# Patient Record
Sex: FEMALE | Race: WHITE | URBAN - METROPOLITAN AREA
[De-identification: names, ages, dates, MRNs, and addresses within clinical notes are randomized per-mention and may not be internally consistent; named-entity substitution may affect disease eponyms.]

---

## 2023-08-29 ENCOUNTER — OFFICE VISIT (OUTPATIENT)
Dept: PRIMARY CARE | Facility: CLINIC | Age: 63
End: 2023-08-29
Payer: COMMERCIAL

## 2023-08-29 VITALS
SYSTOLIC BLOOD PRESSURE: 130 MMHG | DIASTOLIC BLOOD PRESSURE: 80 MMHG | HEIGHT: 65 IN | OXYGEN SATURATION: 96 % | WEIGHT: 182 LBS | HEART RATE: 67 BPM | BODY MASS INDEX: 30.32 KG/M2

## 2023-08-29 DIAGNOSIS — F41.9 ANXIETY TENSION STATE: ICD-10-CM

## 2023-08-29 DIAGNOSIS — Z11.59 NEED FOR HEPATITIS C SCREENING TEST: ICD-10-CM

## 2023-08-29 DIAGNOSIS — Z12.31 ENCOUNTER FOR SCREENING MAMMOGRAM FOR MALIGNANT NEOPLASM OF BREAST: ICD-10-CM

## 2023-08-29 DIAGNOSIS — Z00.00 ANNUAL PHYSICAL EXAM: ICD-10-CM

## 2023-08-29 DIAGNOSIS — Z23 IMMUNIZATION DUE: Primary | ICD-10-CM

## 2023-08-29 DIAGNOSIS — E78.2 HYPERLIPIDEMIA, MIXED: ICD-10-CM

## 2023-08-29 DIAGNOSIS — E78.2 HYPERLIPIDEMIA, MIXED: Primary | ICD-10-CM

## 2023-08-29 PROBLEM — E66.9 OBESITY (BMI 30.0-34.9): Status: ACTIVE | Noted: 2023-08-29

## 2023-08-29 PROBLEM — E66.811 OBESITY (BMI 30.0-34.9): Status: ACTIVE | Noted: 2023-08-29

## 2023-08-29 LAB
ALANINE AMINOTRANSFERASE (SGPT) (U/L) IN SER/PLAS: 25 U/L (ref 7–45)
ALBUMIN (G/DL) IN SER/PLAS: 4.7 G/DL (ref 3.4–5)
ALKALINE PHOSPHATASE (U/L) IN SER/PLAS: 62 U/L (ref 33–136)
ANION GAP IN SER/PLAS: 14 MMOL/L (ref 10–20)
ASPARTATE AMINOTRANSFERASE (SGOT) (U/L) IN SER/PLAS: 42 U/L (ref 9–39)
BILIRUBIN TOTAL (MG/DL) IN SER/PLAS: 1 MG/DL (ref 0–1.2)
CALCIUM (MG/DL) IN SER/PLAS: 9.2 MG/DL (ref 8.6–10.6)
CARBON DIOXIDE, TOTAL (MMOL/L) IN SER/PLAS: 26 MMOL/L (ref 21–32)
CHLORIDE (MMOL/L) IN SER/PLAS: 104 MMOL/L (ref 98–107)
CHOLESTEROL (MG/DL) IN SER/PLAS: 195 MG/DL (ref 0–199)
CHOLESTEROL IN HDL (MG/DL) IN SER/PLAS: 64.5 MG/DL
CHOLESTEROL/HDL RATIO: 3
CREATININE (MG/DL) IN SER/PLAS: 0.73 MG/DL (ref 0.5–1.05)
ESTIMATED AVERAGE GLUCOSE FOR HBA1C: 120 MG/DL
GFR FEMALE: >90 ML/MIN/1.73M2
GLUCOSE (MG/DL) IN SER/PLAS: 87 MG/DL (ref 74–99)
HEMOGLOBIN A1C/HEMOGLOBIN TOTAL IN BLOOD: 5.8 %
HEPATITIS C VIRUS AB PRESENCE IN SERUM: NONREACTIVE
LDL: 114 MG/DL (ref 0–99)
POTASSIUM (MMOL/L) IN SER/PLAS: 5.2 MMOL/L (ref 3.5–5.3)
PROTEIN TOTAL: 7.5 G/DL (ref 6.4–8.2)
SODIUM (MMOL/L) IN SER/PLAS: 139 MMOL/L (ref 136–145)
THYROTROPIN (MIU/L) IN SER/PLAS BY DETECTION LIMIT <= 0.05 MIU/L: 6.38 MIU/L (ref 0.44–3.98)
TRIGLYCERIDE (MG/DL) IN SER/PLAS: 81 MG/DL (ref 0–149)
UREA NITROGEN (MG/DL) IN SER/PLAS: 16 MG/DL (ref 6–23)
VLDL: 16 MG/DL (ref 0–40)

## 2023-08-29 PROCEDURE — 83036 HEMOGLOBIN GLYCOSYLATED A1C: CPT

## 2023-08-29 PROCEDURE — 90715 TDAP VACCINE 7 YRS/> IM: CPT | Performed by: INTERNAL MEDICINE

## 2023-08-29 PROCEDURE — 1036F TOBACCO NON-USER: CPT | Performed by: INTERNAL MEDICINE

## 2023-08-29 PROCEDURE — 80053 COMPREHEN METABOLIC PANEL: CPT

## 2023-08-29 PROCEDURE — 84443 ASSAY THYROID STIM HORMONE: CPT

## 2023-08-29 PROCEDURE — 99396 PREV VISIT EST AGE 40-64: CPT | Performed by: INTERNAL MEDICINE

## 2023-08-29 PROCEDURE — 80061 LIPID PANEL: CPT

## 2023-08-29 PROCEDURE — 90471 IMMUNIZATION ADMIN: CPT | Performed by: INTERNAL MEDICINE

## 2023-08-29 PROCEDURE — 86803 HEPATITIS C AB TEST: CPT

## 2023-08-29 RX ORDER — ROSUVASTATIN CALCIUM 20 MG/1
20 TABLET, COATED ORAL DAILY
Qty: 90 TABLET | Refills: 3 | Status: SHIPPED | OUTPATIENT
Start: 2023-08-29

## 2023-08-29 RX ORDER — ROSUVASTATIN CALCIUM 20 MG/1
20 TABLET, COATED ORAL DAILY
COMMUNITY
Start: 2021-08-26 | End: 2023-08-29 | Stop reason: SDUPTHER

## 2023-08-29 RX ORDER — HYDROXYZINE HYDROCHLORIDE 25 MG/1
12.5-25 TABLET, FILM COATED ORAL EVERY 8 HOURS PRN
Qty: 30 TABLET | Refills: 0 | Status: SHIPPED | OUTPATIENT
Start: 2023-08-29 | End: 2023-09-28

## 2023-08-29 ASSESSMENT — PATIENT HEALTH QUESTIONNAIRE - PHQ9
1. LITTLE INTEREST OR PLEASURE IN DOING THINGS: NOT AT ALL
2. FEELING DOWN, DEPRESSED OR HOPELESS: NOT AT ALL
SUM OF ALL RESPONSES TO PHQ9 QUESTIONS 1 AND 2: 0
SUM OF ALL RESPONSES TO PHQ9 QUESTIONS 1 AND 2: 0
2. FEELING DOWN, DEPRESSED OR HOPELESS: NOT AT ALL
1. LITTLE INTEREST OR PLEASURE IN DOING THINGS: NOT AT ALL

## 2023-08-29 NOTE — PROGRESS NOTES
"mammSubjective   Patient ID: Patricio Luciano is a 62 y.o. female who presents for Annual Exam (Patient here for complete physical exam).    HPI   C/O intermittent tension, due to daily stress exposure, requesting a short supply of medication \"to relax when I need\".  Review of Systems  Anxious state   Objective   /80   Pulse 67   Ht 1.651 m (5' 5\")   Wt 82.6 kg (182 lb)   SpO2 96%   BMI 30.29 kg/m²     Physical Exam  NAD. Cooperative.  HEENT: WNL  Neck: WNL  Lungs CTA  Heart: RRR  Abdomen: WNL  Musculoskeletal system: WNL  Neurologic exam: WNL    Assessment/Plan   Diagnoses and all orders for this visit:  Immunization due  -     diphth,pertus,acell,,tetanus (BoostRIX) 2.5-8-5 Lf-mcg-Lf/0.5mL injection; Inject 0.5 mL into the shoulder, thigh, or buttocks 1 time for 1 dose.  -     Tdap vaccine, age 10 years and older  (BOOSTRIX)  Annual physical exam  -     Comprehensive metabolic panel; Future  -     CBC; Future  -     Hemoglobin A1C; Future  -     TSH; Future  Anxiety tension state  -     hydrOXYzine HCL (Atarax) 25 mg tablet; Take 0.5-1 tablets (12.5-25 mg) by mouth every 8 hours if needed for anxiety (1/2-1 tablet every 8 hours as needed).  Need for hepatitis C screening test  -     Hepatitis C Antibody; Future  Hyperlipidemia, mixed  -     rosuvastatin (Crestor) 20 mg tablet; Take 1 tablet (20 mg) by mouth once daily.  -     CT cardiac scoring wo IV contrast; Future  -     Lipid panel; Future  Encounter for screening mammogram for malignant neoplasm of breast  -     BI mammo bilateral screening tomosynthesis; Future  Updated colonoscopy in 2018, 10 year screening, cologuard in 2022.  GYN exam up to date, last exam in 2023.       "

## 2023-08-30 ENCOUNTER — TELEPHONE (OUTPATIENT)
Dept: PRIMARY CARE | Facility: CLINIC | Age: 63
End: 2023-08-30
Payer: COMMERCIAL

## 2023-08-30 DIAGNOSIS — Z00.00 ANNUAL PHYSICAL EXAM: Primary | ICD-10-CM

## 2023-08-30 RX ORDER — ROSUVASTATIN CALCIUM 20 MG/1
20 TABLET, COATED ORAL NIGHTLY
Qty: 90 TABLET | Refills: 0 | Status: SHIPPED | OUTPATIENT
Start: 2023-08-30

## 2023-09-06 LAB
ERYTHROCYTE DISTRIBUTION WIDTH (RATIO) BY AUTOMATED COUNT: 13.7 % (ref 11.5–14.5)
ERYTHROCYTE MEAN CORPUSCULAR HEMOGLOBIN CONCENTRATION (G/DL) BY AUTOMATED: 31.3 G/DL (ref 32–36)
ERYTHROCYTE MEAN CORPUSCULAR VOLUME (FL) BY AUTOMATED COUNT: 90 FL (ref 80–100)
ERYTHROCYTES (10*6/UL) IN BLOOD BY AUTOMATED COUNT: 5.26 X10E12/L (ref 4–5.2)
HEMATOCRIT (%) IN BLOOD BY AUTOMATED COUNT: 47.6 % (ref 36–46)
HEMOGLOBIN (G/DL) IN BLOOD: 14.9 G/DL (ref 12–16)
LEUKOCYTES (10*3/UL) IN BLOOD BY AUTOMATED COUNT: 6.6 X10E9/L (ref 4.4–11.3)
NRBC (PER 100 WBCS) BY AUTOMATED COUNT: 0 /100 WBC (ref 0–0)
PLATELETS (10*3/UL) IN BLOOD AUTOMATED COUNT: 178 X10E9/L (ref 150–450)

## 2023-09-06 PROCEDURE — 85027 COMPLETE CBC AUTOMATED: CPT

## 2024-09-20 DIAGNOSIS — E78.2 HYPERLIPIDEMIA, MIXED: ICD-10-CM

## 2024-09-20 RX ORDER — ROSUVASTATIN CALCIUM 20 MG/1
20 TABLET, COATED ORAL DAILY
Qty: 90 TABLET | Refills: 0 | Status: SHIPPED | OUTPATIENT
Start: 2024-09-20

## 2024-09-23 ENCOUNTER — APPOINTMENT (OUTPATIENT)
Dept: PRIMARY CARE | Facility: CLINIC | Age: 64
End: 2024-09-23
Payer: COMMERCIAL

## 2024-09-23 ENCOUNTER — LAB (OUTPATIENT)
Dept: LAB | Facility: LAB | Age: 64
End: 2024-09-23
Payer: COMMERCIAL

## 2024-09-23 VITALS
BODY MASS INDEX: 30.16 KG/M2 | OXYGEN SATURATION: 97 % | WEIGHT: 181 LBS | DIASTOLIC BLOOD PRESSURE: 72 MMHG | HEIGHT: 65 IN | SYSTOLIC BLOOD PRESSURE: 119 MMHG | HEART RATE: 76 BPM

## 2024-09-23 DIAGNOSIS — Z00.00 ANNUAL PHYSICAL EXAM: Primary | ICD-10-CM

## 2024-09-23 DIAGNOSIS — Z12.31 VISIT FOR SCREENING MAMMOGRAM: ICD-10-CM

## 2024-09-23 DIAGNOSIS — Z00.00 ANNUAL PHYSICAL EXAM: ICD-10-CM

## 2024-09-23 DIAGNOSIS — Z23 IMMUNIZATION DUE: ICD-10-CM

## 2024-09-23 DIAGNOSIS — E78.2 HYPERLIPIDEMIA, MIXED: ICD-10-CM

## 2024-09-23 LAB
ALBUMIN SERPL BCP-MCNC: 4.4 G/DL (ref 3.4–5)
ALP SERPL-CCNC: 64 U/L (ref 33–136)
ALT SERPL W P-5'-P-CCNC: 18 U/L (ref 7–45)
ANION GAP SERPL CALC-SCNC: 10 MMOL/L (ref 10–20)
AST SERPL W P-5'-P-CCNC: 21 U/L (ref 9–39)
BILIRUB SERPL-MCNC: 0.9 MG/DL (ref 0–1.2)
BUN SERPL-MCNC: 14 MG/DL (ref 6–23)
CALCIUM SERPL-MCNC: 9.2 MG/DL (ref 8.6–10.6)
CHLORIDE SERPL-SCNC: 105 MMOL/L (ref 98–107)
CHOLEST SERPL-MCNC: 180 MG/DL (ref 0–199)
CHOLESTEROL/HDL RATIO: 2.8
CO2 SERPL-SCNC: 30 MMOL/L (ref 21–32)
CREAT SERPL-MCNC: 0.84 MG/DL (ref 0.5–1.05)
EGFRCR SERPLBLD CKD-EPI 2021: 79 ML/MIN/1.73M*2
ERYTHROCYTE [DISTWIDTH] IN BLOOD BY AUTOMATED COUNT: 13.7 % (ref 11.5–14.5)
EST. AVERAGE GLUCOSE BLD GHB EST-MCNC: 120 MG/DL
GLUCOSE SERPL-MCNC: 95 MG/DL (ref 74–99)
HBA1C MFR BLD: 5.8 %
HCT VFR BLD AUTO: 46.6 % (ref 36–46)
HDLC SERPL-MCNC: 64.7 MG/DL
HGB BLD-MCNC: 14.9 G/DL (ref 12–16)
LDLC SERPL CALC-MCNC: 101 MG/DL
MCH RBC QN AUTO: 28.2 PG (ref 26–34)
MCHC RBC AUTO-ENTMCNC: 32 G/DL (ref 32–36)
MCV RBC AUTO: 88 FL (ref 80–100)
NON HDL CHOLESTEROL: 115 MG/DL (ref 0–149)
NRBC BLD-RTO: 0 /100 WBCS (ref 0–0)
PLATELET # BLD AUTO: 157 X10*3/UL (ref 150–450)
POTASSIUM SERPL-SCNC: 4.4 MMOL/L (ref 3.5–5.3)
PROT SERPL-MCNC: 7 G/DL (ref 6.4–8.2)
RBC # BLD AUTO: 5.28 X10*6/UL (ref 4–5.2)
SODIUM SERPL-SCNC: 141 MMOL/L (ref 136–145)
TRIGL SERPL-MCNC: 70 MG/DL (ref 0–149)
TSH SERPL-ACNC: 5.25 MIU/L (ref 0.44–3.98)
VLDL: 14 MG/DL (ref 0–40)
WBC # BLD AUTO: 4.8 X10*3/UL (ref 4.4–11.3)

## 2024-09-23 PROCEDURE — 90471 IMMUNIZATION ADMIN: CPT | Performed by: INTERNAL MEDICINE

## 2024-09-23 PROCEDURE — 90472 IMMUNIZATION ADMIN EACH ADD: CPT | Performed by: INTERNAL MEDICINE

## 2024-09-23 PROCEDURE — 90656 IIV3 VACC NO PRSV 0.5 ML IM: CPT | Performed by: INTERNAL MEDICINE

## 2024-09-23 PROCEDURE — 80061 LIPID PANEL: CPT

## 2024-09-23 PROCEDURE — 1036F TOBACCO NON-USER: CPT | Performed by: INTERNAL MEDICINE

## 2024-09-23 PROCEDURE — 36415 COLL VENOUS BLD VENIPUNCTURE: CPT

## 2024-09-23 PROCEDURE — 83036 HEMOGLOBIN GLYCOSYLATED A1C: CPT

## 2024-09-23 PROCEDURE — 84443 ASSAY THYROID STIM HORMONE: CPT

## 2024-09-23 PROCEDURE — 90679 RSV VACC PREF RECOMB ADJT IM: CPT | Performed by: INTERNAL MEDICINE

## 2024-09-23 PROCEDURE — 80053 COMPREHEN METABOLIC PANEL: CPT

## 2024-09-23 PROCEDURE — 3008F BODY MASS INDEX DOCD: CPT | Performed by: INTERNAL MEDICINE

## 2024-09-23 PROCEDURE — 85027 COMPLETE CBC AUTOMATED: CPT

## 2024-09-23 PROCEDURE — 99396 PREV VISIT EST AGE 40-64: CPT | Performed by: INTERNAL MEDICINE

## 2024-09-23 RX ORDER — ROSUVASTATIN CALCIUM 20 MG/1
20 TABLET, COATED ORAL NIGHTLY
Qty: 90 TABLET | Refills: 3 | Status: SHIPPED | OUTPATIENT
Start: 2024-09-23

## 2024-09-23 ASSESSMENT — PATIENT HEALTH QUESTIONNAIRE - PHQ9
SUM OF ALL RESPONSES TO PHQ9 QUESTIONS 1 AND 2: 0
1. LITTLE INTEREST OR PLEASURE IN DOING THINGS: NOT AT ALL
2. FEELING DOWN, DEPRESSED OR HOPELESS: NOT AT ALL

## 2024-09-23 NOTE — PROGRESS NOTES
"Subjective   Patient ID: MARY Luciano is a 63 y.o. female who presents for Annual Exam (Patient here for complete physical exam).    HPI   The patient presents for an annual wellness exam with no concerns.    Review of Systems  Negative  Objective   /72   Pulse 76   Ht 1.651 m (5' 5\")   Wt 82.1 kg (181 lb)   SpO2 97%   BMI 30.12 kg/m²     Physical Exam  NAD. Cooperative.  HEENT: WNL  Neck: WNL  Lungs CTA  Heart: RRR  Abdomen: WNL  Musculoskeletal system: WNL  Neurologic exam: WNL    Assessment/Plan   Diagnoses and all orders for this visit:  Annual physical exam  -     Comprehensive metabolic panel; Future  -     CBC; Future  -     TSH; Future  -     Lipid panel; Future  -     Hemoglobin A1C; Future  Visit for screening mammogram  -     BI mammo bilateral screening tomosynthesis; Future  Hyperlipidemia, mixed  -     rosuvastatin (Crestor) 20 mg tablet; Take 1 tablet (20 mg) by mouth once daily at bedtime.  -     CT cardiac scoring wo IV contrast; Future  Immunization due  -     Flu vaccine, trivalent, preservative free, age 6 months and greater (Fluarix/Fluzone/Flulaval)  -     RSV, Recombinant, 60 years and older (AREXVY)         "

## 2024-10-07 ENCOUNTER — HOSPITAL ENCOUNTER (OUTPATIENT)
Dept: RADIOLOGY | Facility: CLINIC | Age: 64
Discharge: HOME | End: 2024-10-07
Payer: COMMERCIAL

## 2024-10-07 VITALS — BODY MASS INDEX: 29.99 KG/M2 | WEIGHT: 180 LBS | HEIGHT: 65 IN

## 2024-10-07 DIAGNOSIS — Z12.31 VISIT FOR SCREENING MAMMOGRAM: ICD-10-CM

## 2024-10-07 PROCEDURE — 77067 SCR MAMMO BI INCL CAD: CPT | Performed by: RADIOLOGY

## 2024-10-07 PROCEDURE — 77063 BREAST TOMOSYNTHESIS BI: CPT | Performed by: RADIOLOGY

## 2024-10-07 PROCEDURE — 77067 SCR MAMMO BI INCL CAD: CPT

## 2025-01-16 ENCOUNTER — HOSPITAL ENCOUNTER (OUTPATIENT)
Dept: RADIOLOGY | Facility: CLINIC | Age: 65
Discharge: HOME | End: 2025-01-16
Payer: COMMERCIAL

## 2025-01-16 ENCOUNTER — APPOINTMENT (OUTPATIENT)
Dept: PRIMARY CARE | Facility: CLINIC | Age: 65
End: 2025-01-16
Payer: COMMERCIAL

## 2025-01-16 VITALS
BODY MASS INDEX: 31.78 KG/M2 | HEART RATE: 80 BPM | SYSTOLIC BLOOD PRESSURE: 130 MMHG | OXYGEN SATURATION: 98 % | DIASTOLIC BLOOD PRESSURE: 80 MMHG | WEIGHT: 191 LBS

## 2025-01-16 DIAGNOSIS — G89.29 CHRONIC PAIN OF RIGHT KNEE: ICD-10-CM

## 2025-01-16 DIAGNOSIS — M25.561 CHRONIC PAIN OF RIGHT KNEE: ICD-10-CM

## 2025-01-16 DIAGNOSIS — M25.561 CHRONIC PAIN OF RIGHT KNEE: Primary | ICD-10-CM

## 2025-01-16 DIAGNOSIS — G89.29 CHRONIC PAIN OF RIGHT KNEE: Primary | ICD-10-CM

## 2025-01-16 PROCEDURE — 99214 OFFICE O/P EST MOD 30 MIN: CPT | Performed by: INTERNAL MEDICINE

## 2025-01-16 PROCEDURE — 73560 X-RAY EXAM OF KNEE 1 OR 2: CPT | Mod: RT

## 2025-01-16 PROCEDURE — 1036F TOBACCO NON-USER: CPT | Performed by: INTERNAL MEDICINE

## 2025-01-16 RX ORDER — PREDNISONE 10 MG/1
TABLET ORAL
Qty: 30 TABLET | Refills: 0 | Status: SHIPPED | OUTPATIENT
Start: 2025-01-16

## 2025-02-17 PROBLEM — M25.561 RIGHT KNEE PAIN: Status: ACTIVE | Noted: 2025-02-17

## 2025-02-18 ENCOUNTER — EVALUATION (OUTPATIENT)
Dept: PHYSICAL THERAPY | Facility: CLINIC | Age: 65
End: 2025-02-18
Payer: COMMERCIAL

## 2025-02-18 DIAGNOSIS — G89.29 CHRONIC PAIN OF RIGHT KNEE: ICD-10-CM

## 2025-02-18 DIAGNOSIS — M25.561 RIGHT KNEE PAIN: Primary | ICD-10-CM

## 2025-02-18 DIAGNOSIS — M25.561 CHRONIC PAIN OF RIGHT KNEE: ICD-10-CM

## 2025-02-18 PROCEDURE — 97161 PT EVAL LOW COMPLEX 20 MIN: CPT | Mod: GP | Performed by: PHYSICAL THERAPIST

## 2025-02-18 PROCEDURE — 97110 THERAPEUTIC EXERCISES: CPT | Mod: GP | Performed by: PHYSICAL THERAPIST

## 2025-02-18 NOTE — PROGRESS NOTES
Physical Therapy    Physical Therapy Evaluation and Treatment      Patient Name: Mary Luciano  MRN: [id number]  Today's Date:2/18/25  Visit 1  Time Entry:   Time Calculation  Start Time: 1400  Stop Time: 1445  Time Calculation (min): 45 min  PT Evaluation Time Entry  PT Evaluation (Low) Time Entry: 25  PT Therapeutic Procedures Time Entry  Therapeutic Exercise Time Entry: 20                   Assessment:      Patient presents with clinical signs and symptoms R  knee OA with + knee joint effusion .  These impairments affect ADLs, work, recreational activity, exercise, transfer ability, and ambulation function  that requires skilled PT intervention to resolve and enable patient to return to previous level of function. Factors that may affect progress in PT are chronic pain, obesity, medical co-morbidities, language barrier and patient compliance.  Patient response to initial treatment of   education and exercise was understood by MARY Luciano     Plan:  OP PT Plan  Treatment/Interventions: Education/ Instruction, Cryotherapy, Hot pack, Manual therapy, Neuromuscular re-education, Self care/ home management, Taping techniques, Therapeutic activities, Therapeutic exercises  PT Plan: Skilled PT  PT Frequency: 1 time per week  Duration: 8  Onset Date: 01/01/24  Certification Period Start Date: 02/18/25  Certification Period End Date: 05/19/25  Rehab Potential: Fair  Plan of Care Agreement: Patient        Problem List Items Addressed This Visit             ICD-10-CM    Right knee pain - Primary M25.561    Relevant Orders    Follow Up In Physical Therapy    Follow Up In Physical Therapy      Subjective    MARY Luciano reports that she has insidious onset R medial knee pain . She was on a steroid dose pack which helps.   Worse on steps. She is from [facility] and there is a bit of a language challenge. She has h/o L knee injections that did help  Pain:   6-7/10  Home Living:   Lives with son in home with steps to the  "basement  Prior Level of Function:   Works at [employer] 32 hrs/wk    Objective   Cognition:   A+Ox3  Observation:    Hip and ankle joint clearance:  clear  Posture:  good alignment    Knee ROM: Flexion  AROM  R  125 P L 130 deg   Extension  AROM  R 0 L 0 deg      Knee Strength:  Flex  R 4/5   L 5/5,  Ext R  3+/5  L 5/5     Hip Strength:  Abduction  R  3+/5  L  4/5                  Flexion R  4/5   L 4 /5    Ligament testing:  Lachman's  R  L   medial collateral    R  L  lateral collateral   R  L    Compa's   R  L     all neg    Accessory joint mobility: patella   WNL  Palpation: tenderness to medial joint line  and R tibial anterior flare     Gait: antalgic   R LE stiff at start up    Swelling   circumference:  joint effusion  yes  R   L     6\" above superior patella        9\" below inferior patella        Special Tests:   SLR quad lag   no  , Knee dominant squat 1/4 pain R knee       Treatments:  There ex:  - Supine Bridge  - 1 x daily - 7 x weekly - 2 sets - 10 reps  - Supine Active Straight Leg Raise  - 1 x daily - 7 x weekly - 2 sets - 10 reps - 5 hold  EDUCATION:     extensive education regarding mechanism of injury, relevant functional anatomy, treatment program rational, self management, HEP, and POC    Access Code: [email or web address]  URL: [email or web address]  Date: 02/18/2025  Prepared by: Manjinder Rain    Exercises  - Supine Bridge  - 1 x daily - 7 x weekly - 2 sets - 10 reps  - Supine Active Straight Leg Raise  - 1 x daily - 7 x weekly - 2 sets - 10 reps - 5 hold  Goals:  1. Decrease pain to 0-2/10 severity level   2.  Hip hinge squat mechanics to std chair height x 5  3. Increase R LE  strength to  4 /5 MMT grade  4. Normalize gait pattern pain free at startup  5. Improve out come score by 15 points  6. independent Home exercise program     Insurance Authorization Information  Date of Evaluation: 2/18/25    Onset Date: 1/1/2024    Referring Physician: Julieta Forte     Surgery in " the Last 3 months:  no    CPT Codes: Therapeutic Exercise: 34899 Therapeutic Activity: 49077 Neuromuscular Re-Education: 73175 Manual Therapy: 61516 Gait Trainin Self-Care/Home Management Trainin Mechanical Traction: 28095 Electric Stimulation (Attended): 26147 Electric Stimulation (Unattended): 38383 Vasopneumatic Device: 17780 PT Re-Evaluation: 69382     Diagnosis:   Problem List Items Addressed This Visit             ICD-10-CM    Right knee pain - Primary M25.561    Relevant Orders    Follow Up In Physical Therapy    Follow Up In Physical Therapy          Functional Outcome:  Other Measures  Lower Extremity Funtional Score (LEFS): 29    OT / PT Evaluation complexity:  low    Which of the following best describes the primary reason of therapy: Improving, restoring, or adapting functional mobility or skills    Visits Requested: 10    Date Range: 90 days    Select all conditions that apply: Arthritis Conditions         Manjinder Rain, PT

## 2025-02-19 NOTE — PROGRESS NOTES
Physical Therapy    Physical Therapy Evaluation and Treatment      Patient Name: Mary Luciano  MRN: [id number]  Today's Date:2/18/25  Visit 1  Time Entry:   Time Calculation  Start Time: 1400  Stop Time: 1445  Time Calculation (min): 45 min  PT Evaluation Time Entry  PT Evaluation (Low) Time Entry: 25  PT Therapeutic Procedures Time Entry  Therapeutic Exercise Time Entry: 20                   Assessment:      Patient presents with clinical signs and symptoms R  knee OA with + knee joint effusion .  These impairments affect ADLs, work, recreational activity, exercise, transfer ability, and ambulation function  that requires skilled PT intervention to resolve and enable patient to return to previous level of function. Factors that may affect progress in PT are chronic pain, obesity, medical co-morbidities, language barrier and patient compliance.  Patient response to initial treatment of   education and exercise was understood by MARY Luciano     Plan:  OP PT Plan  Treatment/Interventions: Education/ Instruction, Cryotherapy, Hot pack, Manual therapy, Neuromuscular re-education, Self care/ home management, Taping techniques, Therapeutic activities, Therapeutic exercises  PT Plan: Skilled PT  PT Frequency: 1 time per week  Duration: 8  Onset Date: 01/01/24  Certification Period Start Date: 02/18/25  Certification Period End Date: 05/19/25  Rehab Potential: Fair  Plan of Care Agreement: Patient        Problem List Items Addressed This Visit             ICD-10-CM    Right knee pain - Primary M25.561    Relevant Orders    Follow Up In Physical Therapy    Follow Up In Physical Therapy      Subjective    MARY Luciano reports that she has insidious onset R medial knee pain . She was on a steroid dose pack which helps.   Worse on steps. She is from [facility] and there is a bit of a language challenge. She has h/o L knee injections that did help  Pain:   6-7/10  Home Living:   Lives with son in home with steps to the  "basement  Prior Level of Function:   Works at [employer] 32 hrs/wk    Objective   Cognition:   A+Ox3  Observation:    Hip and ankle joint clearance:  clear  Posture:  good alignment    Knee ROM: Flexion  AROM  R  125 P L 130 deg   Extension  AROM  R 0 L 0 deg      Knee Strength:  Flex  R 4/5   L 5/5,  Ext R  3+/5  L 5/5     Hip Strength:  Abduction  R  3+/5  L  4/5                  Flexion R  4/5   L 4 /5    Ligament testing:  Lachman's  R  L   medial collateral    R  L  lateral collateral   R  L    Compa's   R  L     all neg    Accessory joint mobility: patella   WNL  Palpation: tenderness to medial joint line  and R tibial anterior flare     Gait: antalgic   R LE stiff at start up    Swelling   circumference:  joint effusion  yes  R   L     6\" above superior patella        9\" below inferior patella        Special Tests:   SLR quad lag   no  , Knee dominant squat 1/4 pain R knee       Treatments:  There ex:  - Supine Bridge  - 1 x daily - 7 x weekly - 2 sets - 10 reps  - Supine Active Straight Leg Raise  - 1 x daily - 7 x weekly - 2 sets - 10 reps - 5 hold  EDUCATION:     extensive education regarding mechanism of injury, relevant functional anatomy, treatment program rational, self management, HEP, and POC    Access Code: [email or web address]  URL: [email or web address]  Date: 02/18/2025  Prepared by: Manjinder Rain    Exercises  - Supine Bridge  - 1 x daily - 7 x weekly - 2 sets - 10 reps  - Supine Active Straight Leg Raise  - 1 x daily - 7 x weekly - 2 sets - 10 reps - 5 hold  Goals:  1. Decrease pain to 0-2/10 severity level   2.  Hip hinge squat mechanics to std chair height x 5  3. Increase R LE  strength to  4 /5 MMT grade  4. Normalize gait pattern pain free at startup  5. Improve out come score by 15 points  6. independent Home exercise program     Insurance Authorization Information  Date of Evaluation: 2/18/25    Onset Date: 1/1/2024    Referring Physician: Julieta Forte     Surgery in " the Last 3 months:  no    CPT Codes: Therapeutic Exercise: 34918 Therapeutic Activity: 09348 Neuromuscular Re-Education: 41210 Manual Therapy: 51204 Gait Trainin Self-Care/Home Management Trainin Mechanical Traction: 97931 Electric Stimulation (Attended): 05561 Electric Stimulation (Unattended): 10902 Vasopneumatic Device: 95968 PT Re-Evaluation: 02827     Diagnosis:   Problem List Items Addressed This Visit             ICD-10-CM    Right knee pain - Primary M25.561    Relevant Orders    Follow Up In Physical Therapy    Follow Up In Physical Therapy          Functional Outcome:  Other Measures  Lower Extremity Funtional Score (LEFS): 29    OT / PT Evaluation complexity:  low    Which of the following best describes the primary reason of therapy: Improving, restoring, or adapting functional mobility or skills    Visits Requested: 10    Date Range: 90 days    Select all conditions that apply: Arthritis Conditions         Manjinder Rain, PT

## 2025-03-06 ENCOUNTER — TREATMENT (OUTPATIENT)
Dept: PHYSICAL THERAPY | Facility: CLINIC | Age: 65
End: 2025-03-06
Payer: COMMERCIAL

## 2025-03-06 DIAGNOSIS — M25.561 CHRONIC PAIN OF RIGHT KNEE: ICD-10-CM

## 2025-03-06 DIAGNOSIS — G89.29 CHRONIC PAIN OF RIGHT KNEE: ICD-10-CM

## 2025-03-06 DIAGNOSIS — M25.561 RIGHT KNEE PAIN: ICD-10-CM

## 2025-03-06 PROCEDURE — 97110 THERAPEUTIC EXERCISES: CPT | Mod: GP,CQ

## 2025-03-06 NOTE — PROGRESS NOTES
"Physical Therapy    Physical Therapy Evaluation and Treatment      Patient Name: Patricio Luciano  MRN: [id number]  Today's Date:25  Visit 1  Time Entry:   Time Calculation  Start Time: 325  Stop Time: 410  Time Calculation (min): 45 min     PT Therapeutic Procedures Time Entry  Therapeutic Exercise Time Entry: 30                   Assessment:  Pt was able to progress PRE's and had some fatigue after Nu Step    Plan:   Monitor post nu step sx's        Problem List Items Addressed This Visit             ICD-10-CM    Right knee pain M25.561      Subjective    \"Rising from sitting is the most painful, /10 today.\"  Pain:   5/10      Objective   Decreased WB R initially        Treatments:  There ex:  R QS x 20, 5\"  ASLR x 20, 5\"  Ball squeeze x 20  Blue band hooklying clams x 20  LSLR x 20  Bridges x 20  LAQ x 20  4\" Step ups x 20  Mini squats x 10  Nu Step x 5 min  Supine ice wrap EOS      EDUCATION:     extensive education regarding mechanism of injury, relevant functional anatomy, treatment program rational, self management, HEP, and POC    Access Code: [email or web address]  URL: [email or web address]  Date: 2025  Prepared by: Manjinder Rain    Exercises  - Supine Bridge  - 1 x daily - 7 x weekly - 2 sets - 10 reps  - Supine Active Straight Leg Raise  - 1 x daily - 7 x weekly - 2 sets - 10 reps - 5 hold  Goals:  1. Decrease pain to 0-2/10 severity level   2.  Hip hinge squat mechanics to std chair height x 5  3. Increase R LE  strength to  4 /5 MMT grade  4. Normalize gait pattern pain free at startup  5. Improve out come score by 15 points  6. independent Home exercise program     Insurance Authorization Information  Date of Evaluation: 25    Onset Date: 2024    Referring Physician: Julieta Forte     Surgery in the Last 3 months:  no    CPT Codes: Therapeutic Exercise: 44698 Therapeutic Activity: 77626 Neuromuscular Re-Education: 16165 Manual Therapy: 72372 Gait Trainin " Self-Care/Home Management Trainin Mechanical Traction: 52978 Electric Stimulation (Attended): 80221 Electric Stimulation (Unattended): 26645 Vasopneumatic Device: 64766 PT Re-Evaluation: 90588     Diagnosis:   Problem List Items Addressed This Visit             ICD-10-CM    Right knee pain M25.561          Functional Outcome:       OT / PT Evaluation complexity:  low    Which of the following best describes the primary reason of therapy: Improving, restoring, or adapting functional mobility or skills    Visits Requested: 10    Date Range: 90 days    Select all conditions that apply: Arthritis Conditions         Blanca Lopez, DEAN

## 2025-03-13 ENCOUNTER — TREATMENT (OUTPATIENT)
Dept: PHYSICAL THERAPY | Facility: CLINIC | Age: 65
End: 2025-03-13
Payer: COMMERCIAL

## 2025-03-13 DIAGNOSIS — M25.561 RIGHT KNEE PAIN: ICD-10-CM

## 2025-03-13 DIAGNOSIS — M25.561 CHRONIC PAIN OF RIGHT KNEE: ICD-10-CM

## 2025-03-13 DIAGNOSIS — G89.29 CHRONIC PAIN OF RIGHT KNEE: ICD-10-CM

## 2025-03-13 PROCEDURE — 97110 THERAPEUTIC EXERCISES: CPT | Mod: GP,CQ

## 2025-03-13 NOTE — PROGRESS NOTES
"Physical Therapy    Physical Therapy  Treatment      Patient Name: Patricio Luciano  MRN: [id number]  Today's Date:2/18/25  Visit 3  Time Entry:   Time Calculation  Start Time: 0245  Stop Time: 0330  Time Calculation (min): 45 min     PT Therapeutic Procedures Time Entry  Therapeutic Exercise Time Entry: 30                   Assessment:  Pt was able to progress strengthening to rx, fatigued but no increased pain reported    Plan:   S/L clams, LSLR on wall, pretzels        Problem List Items Addressed This Visit             ICD-10-CM    Right knee pain M25.561      Subjective    \"R knee is about the same.\"  Pain:   5/10 R knee w/ activity, none entering clinic      Objective   Lateral sway w/ amb entering clinic        Treatments:  There ex:  R QS x 20, 5\"  SLR in ER x 20, 5\"  Ball squeeze x 20  Blue band hooklying clams x 20  LSLR x 20  Bridges x 20  LAQ #1.5 x 20  Sit to stand 2 foams x 20  Lat amb over hurdles x 3(4 hurdles)  6\" Step ups x 20  SLS 10x10\"  Nu Step x 5 min, 1.5  Supine ice pack R knee EOS      EDUCATION:     extensive education regarding mechanism of injury, relevant functional anatomy, treatment program rational, self management, HEP, and POC    Access Code: [email or web address]  URL: [email or web address]  Date: 02/18/2025  Prepared by: Manjinder Rain    Exercises  - Supine Bridge  - 1 x daily - 7 x weekly - 2 sets - 10 reps  - Supine Active Straight Leg Raise  - 1 x daily - 7 x weekly - 2 sets - 10 reps - 5 hold  Goals:  1. Decrease pain to 0-2/10 severity level   2.  Hip hinge squat mechanics to std chair height x 5  3. Increase R LE  strength to  4 /5 MMT grade  4. Normalize gait pattern pain free at startup  5. Improve out come score by 15 points  6. independent Home exercise program         Blanca Lopez PTA               "

## 2025-03-20 ENCOUNTER — TREATMENT (OUTPATIENT)
Dept: PHYSICAL THERAPY | Facility: CLINIC | Age: 65
End: 2025-03-20
Payer: COMMERCIAL

## 2025-03-20 DIAGNOSIS — M25.561 CHRONIC PAIN OF RIGHT KNEE: ICD-10-CM

## 2025-03-20 DIAGNOSIS — M25.561 RIGHT KNEE PAIN: ICD-10-CM

## 2025-03-20 DIAGNOSIS — G89.29 CHRONIC PAIN OF RIGHT KNEE: ICD-10-CM

## 2025-03-20 PROCEDURE — 97110 THERAPEUTIC EXERCISES: CPT | Mod: GP,CQ

## 2025-03-20 NOTE — PROGRESS NOTES
"Physical Therapy    Physical Therapy  Treatment      Patient Name: Patricio Luciano  MRN: [id number]  Today's Date:2/18/25  Visit 4  Time Entry:   Time Calculation  Start Time: 0157  Stop Time: 0242  Time Calculation (min): 45 min     PT Therapeutic Procedures Time Entry  Therapeutic Exercise Time Entry: 30                   Assessment:  Pt was able to gently progress, decreased WB remains.  May need some gait training for increased WB    Plan:   S/L clams, LSLR on wall, pretzels        Problem List Items Addressed This Visit             ICD-10-CM    Right knee pain M25.561      Subjective    \"Pain in R knee yesterday, not bad today.  It feels fine after therapy, but it doesn't last.\"  Pain:   0/10 R knee initially      Objective   Decreased WB  R knee entering clinic        Treatments:  There ex:  R QS x 20, 5\"  SLR in ER x 20, 5\"  Bridges x 20  Blue band hooklying clams x 20  LSLR x 20  LAQ #3 x 20  Lat amb x 3, RTB  6\" Step up w/ march x 20  Sit to stand, 1 foam x 20  Nu Step x 5 min, 2.0  Cable Column 3p retro/fwd x 10 ea  Supine ice pack R knee EOS      EDUCATION:     extensive education regarding mechanism of injury, relevant functional anatomy, treatment program rational, self management, HEP, and POC    Access Code: [email or web address]  URL: [email or web address]  Date: 02/18/2025  Prepared by: Manjinder Rain    Exercises  - Supine Bridge  - 1 x daily - 7 x weekly - 2 sets - 10 reps  - Supine Active Straight Leg Raise  - 1 x daily - 7 x weekly - 2 sets - 10 reps - 5 hold  Goals:  1. Decrease pain to 0-2/10 severity level   2.  Hip hinge squat mechanics to std chair height x 5  3. Increase R LE  strength to  4 /5 MMT grade  4. Normalize gait pattern pain free at startup  5. Improve out come score by 15 points  6. independent Home exercise program         Blanca Lopez PTA               "

## 2025-03-27 ENCOUNTER — TREATMENT (OUTPATIENT)
Dept: PHYSICAL THERAPY | Facility: CLINIC | Age: 65
End: 2025-03-27
Payer: COMMERCIAL

## 2025-03-27 DIAGNOSIS — G89.29 CHRONIC PAIN OF RIGHT KNEE: ICD-10-CM

## 2025-03-27 DIAGNOSIS — M25.561 CHRONIC PAIN OF RIGHT KNEE: ICD-10-CM

## 2025-03-27 DIAGNOSIS — M25.561 RIGHT KNEE PAIN: ICD-10-CM

## 2025-03-27 PROCEDURE — 97110 THERAPEUTIC EXERCISES: CPT | Mod: GP,CQ

## 2025-03-27 NOTE — PROGRESS NOTES
"Physical Therapy    Physical Therapy  Treatment      Patient Name: Patricio Luciano  MRN: [id number]  Today's Date:2/18/25  Visit 5  Time Entry:   Time Calculation  Start Time: 0154  Stop Time: 0247  Time Calculation (min): 53 min     PT Therapeutic Procedures Time Entry  Therapeutic Exercise Time Entry: 40                   Assessment:  Pt was able to progress machine/gym work, and reported fatigue, no pain EOS    Plan:   LP, TM        Problem List Items Addressed This Visit             ICD-10-CM    Right knee pain M25.561      Subjective    \"I think the knee is a little better.  Can I walk on the TM for ex?'  Pain:   0/10 R knee initially      Objective   Decreased WB  R knee entering clinic        Treatments:  There ex:    Bridges x 20, green TB  Green band hooklying clams x 20  LSLR x 20  S/L clams x 20  SLS 5x10\" ea, FTT as needed  HR x 20  RTB lat amb x 5, short rail  Sit to stand, Bench/BOSU x 20  Cable Column 3p retro/fwd/laterals x 10 ea  RTB lat amb x 3  Nu Step x 5 min, 2.0  Supine ice wrap R knee EOS      EDUCATION:     extensive education regarding mechanism of injury, relevant functional anatomy, treatment program rational, self management, HEP, and POC    Access Code: [email or web address]  URL: [email or web address]  Date: 02/18/2025  Prepared by: Manjinder Rain    Exercises  - Supine Bridge  - 1 x daily - 7 x weekly - 2 sets - 10 reps  - Supine Active Straight Leg Raise  - 1 x daily - 7 x weekly - 2 sets - 10 reps - 5 hold  Goals:  1. Decrease pain to 0-2/10 severity level   2.  Hip hinge squat mechanics to std chair height x 5  3. Increase R LE  strength to  4 /5 MMT grade  4. Normalize gait pattern pain free at startup  5. Improve out come score by 15 points  6. independent Home exercise program         Blanca Lopez PTA               "

## 2025-04-02 NOTE — PROGRESS NOTES
"Physical Therapy    Physical Therapy  Treatment      Patient Name: Mary Luciano  MRN: [id number]  Today's Date:4/3/25  Visit 6  Time Entry:   Time Calculation  Start Time: 1300  Stop Time: 1350  Time Calculation (min): 50 min     PT Therapeutic Procedures Time Entry  Therapeutic Exercise Time Entry: 40                   Assessment:  We progressed exercise load intensity today and MARY Luciano felt \"good\" \" I like the knee extension machine\"  Plan:   Increase pain free strengthening exercise intensity, leg press or shuttle        Problem List Items Addressed This Visit             ICD-10-CM    Right knee pain - Primary M25.561      Subjective    The exercise reduces the pain after therapy. 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  Pain:   3-4/10 R knee     Objective   Palpable medial R knee joint and pes anserine pain        Treatments:  There ex:    Bridges x 2x12,   Seated hamstring curl 1 plate 2 x 12  Seated knee extension 1 plate 2x12  Wall squat 3x 15 sec  Sit to stand 3 x 5  Cable Column 3p retro/laterals x 5ea  JSB  lat amb x 3  Nu Step x 6 min, lv 2.6  60 RPM  TM  level 6 min 2 MPH    Supine ice wrap R knee EOS      EDUCATION:     Added wall squat isometric to HEP  Goals:  1. Decrease pain to 0-2/10 severity level   2.  Hip hinge squat mechanics to std chair height x 5  3. Increase R LE  strength to  4 /5 MMT grade  4. Normalize gait pattern pain free at startup  5. Improve out come score by 15 points  6. independent Home exercise program         Manjinder Rain, PT               "

## 2025-04-03 ENCOUNTER — TREATMENT (OUTPATIENT)
Dept: PHYSICAL THERAPY | Facility: CLINIC | Age: 65
End: 2025-04-03
Payer: COMMERCIAL

## 2025-04-03 DIAGNOSIS — M25.561 CHRONIC PAIN OF RIGHT KNEE: Primary | ICD-10-CM

## 2025-04-03 DIAGNOSIS — M25.561 RIGHT KNEE PAIN: ICD-10-CM

## 2025-04-03 DIAGNOSIS — G89.29 CHRONIC PAIN OF RIGHT KNEE: Primary | ICD-10-CM

## 2025-04-03 PROCEDURE — 97110 THERAPEUTIC EXERCISES: CPT | Mod: GP | Performed by: PHYSICAL THERAPIST

## 2025-04-10 ENCOUNTER — TREATMENT (OUTPATIENT)
Dept: PHYSICAL THERAPY | Facility: CLINIC | Age: 65
End: 2025-04-10
Payer: COMMERCIAL

## 2025-04-10 DIAGNOSIS — G89.29 CHRONIC PAIN OF RIGHT KNEE: ICD-10-CM

## 2025-04-10 DIAGNOSIS — M25.561 CHRONIC PAIN OF RIGHT KNEE: ICD-10-CM

## 2025-04-10 DIAGNOSIS — M25.561 RIGHT KNEE PAIN: ICD-10-CM

## 2025-04-10 PROCEDURE — 97110 THERAPEUTIC EXERCISES: CPT | Mod: GP | Performed by: PHYSICAL THERAPIST

## 2025-04-10 NOTE — PROGRESS NOTES
Physical Therapy    Physical Therapy  Treatment      Patient Name: Mary Luciano  MRN: [id number]  Today's Date:4/10/25  Visit 7  Time Entry:   Time Calculation  Start Time: 1300  Stop Time: 1345  Time Calculation (min): 45 min     PT Therapeutic Procedures Time Entry  Therapeutic Exercise Time Entry: 45                   Assessment:  MARY Luciano tolerating increasing exercise intensity with muscular fatigue but not extra knee pain. She feels that she can continue on an independent HEP now   Plan:  Physical Therapy    Discharge Summary    Name: Mary Luciano  MRN: [id number]  : 1960  Date: 04/10/25    Discharge Summary: PT    Discharge Information: Date of discharge 4/10/25, Date of last visit 4/10/25, Date of evaluation 25, and Number of attended visits 6    Therapy Summary: see objective and assessment    Discharge Status: see objective and assessment     Rehab Discharge Reason: Achieved all and/or the most significant goals(s)        Problem List Items Addressed This Visit             ICD-10-CM    Right knee pain M25.561      Subjective    MARY Luciano reports that she was more sore for a day after last PT visit secondary to increase exercise intensity. Overall she reportss that she feels stronger but prolonged weight bearing at work or start up after sitting a long time R knee is still stiff and pain    Pain:   3-4/10 R knee     Objective          Knee ROM: Flexion  AROM  R  125 P L 130 deg   Extension  AROM  R 0 L 0 deg      Knee Strength:  Flex  R 4/5   L 5/5,  Ext R  4/5  L 5/5     Hip Strength:  Abduction  R  4/5  L  4/5                  Flexion R  4/5   L 4 /5     Ligament testing:  Lachman's  R  L   medial collateral    R  L  lateral collateral   R  L    Compa's   R  L     all neg     Accessory joint mobility: patella   WNL  Palpation: tenderness to medial joint line  and R tibial anterior flare     Gait: antalgic   R LE stiff at start up, Slight R Trendelenberg     Swelling    circumference:  joint effusion  yes  R   slight        Special Tests:   SLR quad lag   no  , Knee dominant squat no  pain R knee when performed with proper hip hinge mechanics    Treatments:  There ex:    Shuttle leg press 3 band resistance 2x12  Seated hamstring curl 2 plate 2 x 12  Seated knee extension 1 plate 2x12  Wall squat 3x 15 sec  Sit to stand 3 x 6  Cable Column 3p retro/laterals x 5ea   Green tband  lat amb x103  Nu Step x 7 min, lv 2.6  60 RPM  TM  level 6 min 2.5 MPH elevation 2 after 2nd min    Supine ice wrap R knee EOS      EDUCATION:     Written HEP review handout given to MARY Luciano   Goals:  1. Decrease pain to 0-2/10 severity level PA  2.  Hip hinge squat mechanics to std chair height x 5A  3. Increase R LE  strength to  4 /5 MMT gradeA  4. Normalize gait pattern pain free at startupPA  5. Improve out come score by 15 points not accessed  6. independent Home exercise program A        Manjinder Rain, PT

## 2025-04-11 ENCOUNTER — HOSPITAL ENCOUNTER (OUTPATIENT)
Dept: RADIOLOGY | Facility: HOSPITAL | Age: 65
Discharge: HOME | End: 2025-04-11
Payer: COMMERCIAL

## 2025-04-11 DIAGNOSIS — E78.2 HYPERLIPIDEMIA, MIXED: ICD-10-CM

## 2025-04-11 PROCEDURE — 75571 CT HRT W/O DYE W/CA TEST: CPT

## 2025-04-14 DIAGNOSIS — R91.1 LUNG NODULE: Primary | ICD-10-CM

## 2025-09-11 ENCOUNTER — APPOINTMENT (OUTPATIENT)
Dept: PRIMARY CARE | Facility: CLINIC | Age: 65
End: 2025-09-11
Payer: COMMERCIAL

## 2026-01-06 ENCOUNTER — APPOINTMENT (OUTPATIENT)
Dept: PRIMARY CARE | Facility: CLINIC | Age: 66
End: 2026-01-06
Payer: COMMERCIAL